# Patient Record
Sex: MALE | Race: WHITE | URBAN - METROPOLITAN AREA
[De-identification: names, ages, dates, MRNs, and addresses within clinical notes are randomized per-mention and may not be internally consistent; named-entity substitution may affect disease eponyms.]

---

## 2019-12-13 ENCOUNTER — TRANSFERRED RECORDS (OUTPATIENT)
Dept: HEALTH INFORMATION MANAGEMENT | Facility: CLINIC | Age: 29
End: 2019-12-13

## 2019-12-13 ENCOUNTER — MEDICAL CORRESPONDENCE (OUTPATIENT)
Dept: HEALTH INFORMATION MANAGEMENT | Facility: CLINIC | Age: 29
End: 2019-12-13

## 2020-01-09 ENCOUNTER — TELEPHONE (OUTPATIENT)
Dept: OPHTHALMOLOGY | Facility: CLINIC | Age: 30
End: 2020-01-09

## 2020-01-09 ENCOUNTER — OFFICE VISIT (OUTPATIENT)
Dept: OPHTHALMOLOGY | Facility: CLINIC | Age: 30
End: 2020-01-09
Attending: OPHTHALMOLOGY
Payer: MEDICAID

## 2020-01-09 DIAGNOSIS — H46.9 OPTIC NEUROPATHY, BILATERAL: ICD-10-CM

## 2020-01-09 DIAGNOSIS — H53.10 SUBJECTIVE VISUAL DISTURBANCE: Primary | ICD-10-CM

## 2020-01-09 DIAGNOSIS — H46.9 OPTIC NEUROPATHY: Primary | ICD-10-CM

## 2020-01-09 PROCEDURE — 92133 CPTRZD OPH DX IMG PST SGM ON: CPT | Mod: ZF | Performed by: OPHTHALMOLOGY

## 2020-01-09 PROCEDURE — 92083 EXTENDED VISUAL FIELD XM: CPT | Mod: ZF | Performed by: OPHTHALMOLOGY

## 2020-01-09 PROCEDURE — G0463 HOSPITAL OUTPT CLINIC VISIT: HCPCS | Mod: ZF

## 2020-01-09 ASSESSMENT — CONF VISUAL FIELD
OD_INFERIOR_TEMPORAL_RESTRICTION: 3
OD_SUPERIOR_TEMPORAL_RESTRICTION: 3
OS_NORMAL: 1

## 2020-01-09 ASSESSMENT — TONOMETRY
OD_IOP_MMHG: 15
OS_IOP_MMHG: 14
IOP_METHOD: ICARE

## 2020-01-09 ASSESSMENT — EXTERNAL EXAM - LEFT EYE: OS_EXAM: NORMAL

## 2020-01-09 ASSESSMENT — SLIT LAMP EXAM - LIDS
COMMENTS: NORMAL
COMMENTS: NORMAL

## 2020-01-09 ASSESSMENT — VISUAL ACUITY
OD_CC: 20/100
OS_CC: 20/200
METHOD: SNELLEN - LINEAR
CORRECTION_TYPE: GLASSES

## 2020-01-09 ASSESSMENT — REFRACTION_WEARINGRX
OS_SPHERE: +3.25
OD_SPHERE: +3.25

## 2020-01-09 ASSESSMENT — EXTERNAL EXAM - RIGHT EYE: OD_EXAM: NORMAL

## 2020-01-09 NOTE — LETTER
2020         RE:  :  MRN: Sawyer Beebe  1990  [id number]     Dear Dr. Hernandez,    Thank you for asking me to see your very pleasant patient, Sawyer Beebe, in neuro-ophthalmic consultation. I would like to thank you for sending your records and I have summarized them in the history of present illness. My assessment and plan are below. For further details, please see my attached clinic note.      Assessment & Plan     Sawyer Beebe is a 29 year old male with the following diagnoses:   1. Optic neuropathy       Consultation requested by Dr. Kwok for bilateral  vision loss. It began in October and has slowly been getting worse. It worsened for a couple months and then began to stabilize. Mom's brother with Chito's with [id number] mutation.  Mom's cousin has Peter's anomaly.  Only sibling of mom to have vision problems and no cousins.  Denies numbness or tingling.      Family history: LHON in maternal uncle, 15121 mutation  Medications: None    visual acuity 20/100 RIGHT eye and 20/200 left eye.  Color vision 3.5/11 RIGHT eye and 3/11 left eye.  His pupils are sluggish with no afferent pupillary defect.  Anterior segment exam normal.  Fundus exam with peripapillary telangiectasias.      It is my impression that Mr Beebe has a bilateral  Optic neuropathy with centrocecal scotomata.  He has mild optic nerve elevation both eyes with likely pseudoedema. There are peripapillary telangiectasias.  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  He does have a hemorrhage in the right eye, which is atypical for Chito's Hereditary Optic Neuropathy (LHON).  I will also obtain Chito's Hereditary Optic Neuropathy (LHON) testing.    Will order MRI to look for inflammation.  Patient not sure he wants to have this because of his insurance.  Will ask him to take idebenone 900 mg/day.  Follow up 2 months sooner as  needed for worsening symptoms.      Again, thank you for allowing me to participate in the care of your patient.      Sincerely,    Uday Kwong MD  Professor  Ophthalmology Residency   Director of Neuro-Ophthalmology  [employer] Endowed Chair  Departments of Ophthalmology, Neurology, and Neurosurgery  [facility] [address] [place]  [address]  T - [phone]  F - [phone]  TIMOTEO [email or web address]      CC: Heber Hernandez, OD  [facility]  [address]  [facility] [address]  VIA Facsimile: [phone]       DX = Chito's Hereditary Optic Neuropathy (LHON), family history, telangiectasia, centrocecal scotoma

## 2020-01-09 NOTE — NURSING NOTE
Chief Complaints and History of Present Illnesses   Patient presents with     Blurred Vision Evaluation     Chief Complaint(s) and History of Present Illness(es)     Blurred Vision Evaluation               Comments     Sawyer Beebe is a 29 year old male who presents today for    1. Vision loss, bilateral  Progressive. Onset 3 months ago. No precipitating event.   Patient has family history of LHON (maternal uncle)  Vision continuing to decline per patient account.   Patient has not had an MRI of brain done, or genetic testing done.   No eye pain.   Kayy RAYMOND 8:45 AM January 9, 2020

## 2020-01-09 NOTE — NURSING NOTE
Chief Complaints and History of Present Illnesses   Patient presents with     Blurred Vision Evaluation     Chief Complaint(s) and History of Present Illness(es)     Blurred Vision Evaluation               Comments     Sawyer Beebe is a 29 year old male who presents today for    1. Vision loss, bilateral  Progressive. Onset 3 months ago. No precipitating event.   Patient has family history of LHON (cousin)  Vision continuing to decline per patient account.   Patient has not had an MRI of brain done, or genetic testing done.   No eye pain.   Kayy RAYMOND 8:45 AM January 9, 2020

## 2020-01-09 NOTE — PROGRESS NOTES
Assessment & Plan     Sawyer Beebe is a 29 year old male with the following diagnoses:   1. Optic neuropathy       Consultation requested by Dr. Kwok for bilateral  vision loss.  It began in October and has slowly been getting worse.  It worsened for a couple months and then began to stabilize.  Mom's brother with Chito's with 38053 mutation.  Mom's cousin has Peter's anomaly.  Only sibling of mom to have vision problems and no cousins.  Denies numbness or tingling.        Family history: LHON in maternal uncle, 12245 mutation  Medications: None    visual acuity 20/100 RIGHT eye and 20/200 left eye.  Color vision 3.5/11 RIGHT eye and 3/11 left eye.  His pupils are sluggish with no afferent pupillary defect.  Anterior segment exam normal.  Fundus exam with peripapillary telangiectasias.      It is my impression that Mr Beebe has a bilateral  Optic neuropathy with centrocecal scotomata.  He has mild optic nerve elevation both eyes with likely pseudoedema. There are peripapillary telangiectasias.  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  He does have a hemorrhage in the right eye, which is atypical for Chito's Hereditary Optic Neuropathy (LHON).  I will also obtain Chito's Hereditary Optic Neuropathy (LHON) testing.    Will order MRI to look for inflammation.  Patient not sure he wants to have this because of his insurance.  Will ask him to take idebenone 900 mg/day.  Follow up 2 months sooner as needed for worsening symptoms.           Attending Physician Attestation:  Complete documentation of historical and exam elements from today's encounter can be found in the full encounter summary report (not reduplicated in this progress note).  I personally obtained the chief complaint(s) and history of present illness.  I confirmed and edited as necessary the review of systems, past medical/surgical  history, family history, social history, and examination findings as documented by others; and I examined the patient myself.  I personally reviewed the relevant tests, images, and reports as documented above.  I formulated and edited as necessary the assessment and plan and discussed the findings and management plan with the patient and family. - Uday Kwong MD

## 2020-01-09 NOTE — Clinical Note
1/9/2020       RE: Saw[facility]  [address] [facility] [address]     Dear Colleague,    Thank you for referring your patient, Sawyer Beebe, to the EYE CLINIC at [facility]. Please see a copy of my visit note below.           Assessment & Plan     Sawyer Beebe is a 29 year old male with the following diagnoses:   1. Optic neuropathy       Consultation requested by Dr. Kwok for bilateral  vision loss.  It began in October and has slowly been getting worse.  It worsened for a couple months and then began to stabilize.  Mom's brother with Chito's with 44553 mutation.  Mom's cousin has Peter's anomaly.  Only sibling of mom to have vision problems and no cousins.  Denies numbness or tingling.        Family history: LHON in maternal uncle, 72336 mutation  Medications: None    visual acuity 20/100 RIGHT eye and 20/200 left eye.  Color vision 3.5/11 RIGHT eye and 3/11 left eye.  His pupils are sluggish with no afferent pupillary defect.  Anterior segment exam normal.  Fundus exam with peripapillary telangiectasias.      It is my impression that Mr Beebe has a bilateral  Optic neuropathy with centrocecal scotomata.  He has mild optic nerve elevation both eyes with likely pseudoedema. There are peripapillary telangiectasias.  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  He does have a hemorrhage in the right eye, which is atypical for Chito's Hereditary Optic Neuropathy (LHON).  I will also obtain Chito's Hereditary Optic Neuropathy (LHON) testing.    Will order MRI to look for inflammation.  Patient not sure he wants to have this because of his insurance.  Will ask him to take idebenone 900 mg/day.  Follow up 2 months sooner as needed for worsening symptoms.           Attending Physician Attestation:  Complete documentation of historical and exam elements from  today's encounter can be found in the full encounter summary report (not reduplicated in this progress note).  I personally obtained the chief complaint(s) and history of present illness.  I confirmed and edited as necessary the review of systems, past medical/surgical history, family history, social history, and examination findings as documented by others; and I examined the patient myself.  I personally reviewed the relevant tests, images, and reports as documented above.  I formulated and edited as necessary the assessment and plan and discussed the findings and management plan with the patient and family. - Uday Kwong MD      Again, thank you for allowing me to participate in the care of your patient.      Sincerely,    Uday Kwong MD

## 2020-01-10 ENCOUNTER — TELEPHONE (OUTPATIENT)
Dept: OPHTHALMOLOGY | Facility: CLINIC | Age: 30
End: 2020-01-10

## 2020-01-10 NOTE — TELEPHONE ENCOUNTER
PAtient had LHON lab drawn yesterday and the  told patient that the forms were his to keep.  Lab needs to send those forms with sample.  Had patient fill out forms and send to us.  Then forwarded to lab and let Sandee at the lab know that just faxed.  She will have sent out on Monday.     Yasmin Pressley on 1/10/2020 at 12:53 PM

## 2020-01-14 ENCOUNTER — TELEPHONE (OUTPATIENT)
Dept: OPHTHALMOLOGY | Facility: CLINIC | Age: 30
End: 2020-01-14

## 2020-01-14 LAB — MISCELLANEOUS TEST: NORMAL

## 2020-01-14 NOTE — TELEPHONE ENCOUNTER
Mom Amara called wanting results of tests.  I can send to Sawyer at the home address as they are his chart notesNotified mom that would send there.  Sawyer can also see his results on his [employer] account.

## 2020-01-15 ENCOUNTER — TELEPHONE (OUTPATIENT)
Dept: OPHTHALMOLOGY | Facility: CLINIC | Age: 30
End: 2020-01-15

## 2020-01-15 NOTE — TELEPHONE ENCOUNTER
Spoke to Amara about whether or not her brother is comfortable with us sharing his information with the lab.  She stated that he was and that he gave his lab requisition form for us to share.  Forwarded that form onto lab.      Yasmin Pressley on 1/15/2020 at 9:44 AM

## 2020-01-28 ENCOUNTER — TELEPHONE (OUTPATIENT)
Dept: OPHTHALMOLOGY | Facility: CLINIC | Age: 30
End: 2020-01-28

## 2020-01-28 NOTE — TELEPHONE ENCOUNTER
Patient and his family have decided to pursue MRI.  Would like to have done at [facility].  Will fax order to them and have them reach out to patient to schedule.  Will be in touch after MRI with results.    Yasmin Pressley on 1/28/2020 at 11:38 AM

## 2020-03-02 ENCOUNTER — HEALTH MAINTENANCE LETTER (OUTPATIENT)
Age: 30
End: 2020-03-02

## 2020-04-28 ENCOUNTER — OFFICE VISIT (OUTPATIENT)
Dept: OPHTHALMOLOGY | Facility: CLINIC | Age: 30
End: 2020-04-28
Attending: OPHTHALMOLOGY
Payer: COMMERCIAL

## 2020-04-28 DIAGNOSIS — H53.40 VISUAL FIELD DEFECT: ICD-10-CM

## 2020-04-28 DIAGNOSIS — H47.22 LEBER HEREDITARY OPTIC NEUROPATHY: Primary | ICD-10-CM

## 2020-04-28 DIAGNOSIS — H46.9 OPTIC NEUROPATHY: ICD-10-CM

## 2020-04-28 PROCEDURE — 92133 CPTRZD OPH DX IMG PST SGM ON: CPT | Mod: ZF | Performed by: OPHTHALMOLOGY

## 2020-04-28 PROCEDURE — G0463 HOSPITAL OUTPT CLINIC VISIT: HCPCS | Mod: ZF | Performed by: TECHNICIAN/TECHNOLOGIST

## 2020-04-28 PROCEDURE — 92083 EXTENDED VISUAL FIELD XM: CPT | Mod: ZF | Performed by: OPHTHALMOLOGY

## 2020-04-28 ASSESSMENT — VISUAL ACUITY
OS_CC: 20/300
OD_CC: 20/400
METHOD: SNELLEN - LINEAR
CORRECTION_TYPE: CONTACTS

## 2020-04-28 ASSESSMENT — SLIT LAMP EXAM - LIDS
COMMENTS: NORMAL
COMMENTS: NORMAL

## 2020-04-28 ASSESSMENT — TONOMETRY
OS_IOP_MMHG: 16
OD_IOP_MMHG: 15
IOP_METHOD: ICARE

## 2020-04-28 ASSESSMENT — CONF VISUAL FIELD
OS_SUPERIOR_NASAL_RESTRICTION: 2
OD_SUPERIOR_NASAL_RESTRICTION: 2
OS_SUPERIOR_TEMPORAL_RESTRICTION: 2
OD_INFERIOR_TEMPORAL_RESTRICTION: 2
OS_INFERIOR_NASAL_RESTRICTION: 2
OD_SUPERIOR_TEMPORAL_RESTRICTION: 2
OD_INFERIOR_NASAL_RESTRICTION: 2
OS_INFERIOR_TEMPORAL_RESTRICTION: 2
METHOD: COUNTING FINGERS

## 2020-04-28 ASSESSMENT — EXTERNAL EXAM - LEFT EYE: OS_EXAM: NORMAL

## 2020-04-28 ASSESSMENT — EXTERNAL EXAM - RIGHT EYE: OD_EXAM: NORMAL

## 2020-04-28 NOTE — LETTER
2020         RE:  :  MRN: Sawyer Beebe  1990  [id number]     Dear Dr. Hernandez:     Your patient, Sawyer Beebe, returned for neuro-ophthalmic follow up. My assessment and plan are below.  For further details, please see my attached clinic note.      Assessment & Plan     Sawyer Beebe is a 29 year old male with the following diagnoses:   1. Optic neuropathy    2. Visual field defect       Patient is a 29 year old male with presumed Chito;s Hereditary Optic Neuropathy (LHON). Last visit was 2 months ago. He reports that his vision has gotten worse since last visit, but he is unchanged in what he can do for activities of daily living. He got a MRI since his last visit which was read as negative. He is taking taking 800 mg idebenone every other day alternating with 1000 mg. Genetic test for LHON. Has occasional stars and sparkles. Denies visual hallucinations.     MRI orbit 2020:  IMPRESSION:   1.  Normal orbit MRI.    Family history: LHON in maternal uncle, 70577 mutation  Medications: Idebenone 900 mg daily    Visual acuity 20/400 RIGHT eye and 20/300 left eye.  Color vision 0/11 RIGHT eye and 0/11 left eye.  His pupils are sluggish with no afferent pupillary defect.  Anterior segment exam normal.  Fundus exam with peripapillary telangiectasias and temporal pallor of both optic nerves.      Visual field worse with expansion of cecocentral scotomata to include more peripheral loss. OCT now with temporal thinning.     It is my impression that Mr Beebe has a bilateral optic neuropathy most likely due to Chito's Hereditary Optic Neuropathy (LHON) with positive family history of the 48754 mutation and characteristic fundus features.  I have personally reviewed the MRI and there is no evidence of optic nerve enhancement concerning for optic neuritis. LHON genetic test pending. Continue idebenone 900 mg/day.  Follow up 1 year.     Again, thank you for allowing me to participate in the care of your  patient.      Sincerely,    Uday Kwong MD  Professor  Ophthalmology Residency   Director of Neuro-Ophthalmology  [employer] Endowed Chair  Departments of Ophthalmology, Neurology, and Neurosurgery  [facility] [address] [place] [address]  [address]  T - [phone]  F - [phone]  TIMOTEO [email or web address]      CC: Heber Hernandez, OD  [facility]  [address] [place] [address]  VIA Facsimile: [phone]    DX = Chito's Hereditary Optic Neuropathy (LHON)

## 2020-04-28 NOTE — Clinical Note
4/28/2020       RE: [facility]  [address] [facility] [address] [address]     Dear Colleague,    Thank you for referring your patient, Sawyer Beebe, to the EYE CLINIC at [facility]. Please see a copy of my visit note below.           Assessment & Plan     Sawyer Beebe is a 29 year old male with the following diagnoses:   1. Optic neuropathy    2. Visual field defect       Patient is a 29 year old male with presumed Chito;s Hereditary Optic Neuropathy (LHON). Last visit was 2 months ago. He reports that his vision has gotten worse since last visit, but he is unchanged in what he can do for activities of daily living. He got a MRI since his last visit which was read as negative. He is taking taking 800 mg idebenone every other day alternating with 1000 mg. Genetic test for LHON. Has occasional stars and sparkles. Denies visual hallucinations.     MRI orbit 2/6/2020:  IMPRESSION:   1.  Normal orbit MRI.    Family history: LHON in maternal uncle, 45[address] mutation  Medications: Idebenone 900 mg daily    Visual acuity 20/400 RIGHT eye and 20/300 left eye.  Color vision 0/11 RIGHT eye and 0/11 left eye.  His pupils are sluggish with no afferent pupillary defect.  Anterior segment exam normal.  Fundus exam with peripapillary telangiectasias and temporal pallor of both optic nerves.      Visual field worse with expansion of cecocentral scotomata to include more peripheral loss. OCT now with temporal thinning.     It is my impression that Mr Beebe has a bilateral optic neuropathy most likely due to Chito's Hereditary Optic Neuropathy (LHON) with positive family history of the 93626 mutation and characteristic fundus features.  I have personally reviewed the MRI and there is no evidence of optic nerve enhancement concerning for optic neuritis. LHON genetic test pending. Continue idebenone 900 mg/day.  Follow up 1 year.             Attending Physician Attestation:  Complete  documentation of historical and exam elements from today's encounter can be found in the full encounter summary report (not reduplicated in this progress note).  I personally obtained the chief complaint(s) and history of present illness.  I confirmed and edited as necessary the review of systems, past medical/surgical history, family history, social history, and examination findings as documented by others; and I examined the patient myself.  I personally reviewed the relevant tests, images, and reports as documented above.  I formulated and edited as necessary the assessment and plan and discussed the findings and management plan with the patient and family. I personally reviewed the ophthalmic test(s) associated with this encounter, agree with the interpretation(s) as documented by the resident/fellow, and have edited the corresponding report(s) as necessary.  - Uday Villa MD  Ophthalmology, PGY-5  Neuro-Ophthalmology Fellow      Again, thank you for allowing me to participate in the care of your patient.      Sincerely,    Uday Kwong MD

## 2020-04-28 NOTE — PROGRESS NOTES
Assessment & Plan     Sawyer Beebe is a 29 year old male with the following diagnoses:   1. Optic neuropathy    2. Visual field defect       Patient is a 29 year old male with presumed Chito;s Hereditary Optic Neuropathy (LHON). Last visit was 2 months ago. He reports that his vision has gotten worse since last visit, but he is unchanged in what he can do for activities of daily living. He got a MRI since his last visit which was read as negative. He is taking taking 800 mg idebenone every other day alternating with 1000 mg. Genetic test for LHON. Has occasional stars and sparkles. Denies visual hallucinations.     MRI orbit 2/6/2020:  IMPRESSION:   1.  Normal orbit MRI.    Family history: LHON in maternal uncle, 51241 mutation  Medications: Idebenone 900 mg daily    Visual acuity 20/400 RIGHT eye and 20/300 left eye.  Color vision 0/11 RIGHT eye and 0/11 left eye.  His pupils are sluggish with no afferent pupillary defect.  Anterior segment exam normal.  Fundus exam with peripapillary telangiectasias and temporal pallor of both optic nerves.      Visual field worse with expansion of cecocentral scotomata to include more peripheral loss. OCT now with temporal thinning.     It is my impression that Mr Beebe has a bilateral optic neuropathy most likely due to Chito's Hereditary Optic Neuropathy (LHON) with positive family history of the 38283 mutation and characteristic fundus features.  I have personally reviewed the MRI and there is no evidence of optic nerve enhancement concerning for optic neuritis. LHON genetic test pending. Continue idebenone 900 mg/day.  Follow up 1 year.             Attending Physician Attestation:  Complete documentation of historical and exam elements from today's encounter can be found in the full encounter summary report (not reduplicated in this progress note).  I personally obtained the chief complaint(s) and history of present illness.  I confirmed and edited as necessary the  review of systems, past medical/surgical history, family history, social history, and examination findings as documented by others; and I examined the patient myself.  I personally reviewed the relevant tests, images, and reports as documented above.  I formulated and edited as necessary the assessment and plan and discussed the findings and management plan with the patient and family. I personally reviewed the ophthalmic test(s) associated with this encounter, agree with the interpretation(s) as documented by the resident/fellow, and have edited the corresponding report(s) as necessary.  - Uday Villa MD  Ophthalmology, PGY-5  Neuro-Ophthalmology Fellow

## 2020-05-20 ENCOUNTER — TELEPHONE (OUTPATIENT)
Dept: OPHTHALMOLOGY | Facility: CLINIC | Age: 30
End: 2020-05-20

## 2020-05-20 NOTE — TELEPHONE ENCOUNTER
Patient's mom called and requested notes from last visit be mailed to patient.  Mailed per their request.      Yasmin Pressley on 5/20/2020 at 9:09 AM

## 2020-12-14 ENCOUNTER — HEALTH MAINTENANCE LETTER (OUTPATIENT)
Age: 30
End: 2020-12-14

## 2021-01-28 ENCOUNTER — TRANSFERRED RECORDS (OUTPATIENT)
Dept: HEALTH INFORMATION MANAGEMENT | Facility: CLINIC | Age: 31
End: 2021-01-28

## 2021-02-01 ENCOUNTER — TELEPHONE (OUTPATIENT)
Dept: OPHTHALMOLOGY | Facility: CLINIC | Age: 31
End: 2021-02-01

## 2021-02-01 NOTE — TELEPHONE ENCOUNTER
Mom left voicemail wanting patient to get set up for a low vision refraction.  Assisted with scheduling    Yasmin Pressley on 2/1/2021 at 1:36 PM

## 2021-02-11 ENCOUNTER — OFFICE VISIT (OUTPATIENT)
Dept: OPTOMETRY | Facility: CLINIC | Age: 31
End: 2021-02-11
Payer: COMMERCIAL

## 2021-02-11 DIAGNOSIS — H46.9 OPTIC NEUROPATHY, BILATERAL: Primary | ICD-10-CM

## 2021-02-11 DIAGNOSIS — H52.03 HYPERMETROPIA OF BOTH EYES: ICD-10-CM

## 2021-02-11 ASSESSMENT — CONF VISUAL FIELD
OD_SUPERIOR_TEMPORAL_RESTRICTION: 2
OD_INFERIOR_TEMPORAL_RESTRICTION: 2
OD_SUPERIOR_NASAL_RESTRICTION: 2
OS_INFERIOR_TEMPORAL_RESTRICTION: 2
OS_INFERIOR_NASAL_RESTRICTION: 2
OS_SUPERIOR_TEMPORAL_RESTRICTION: 2
OD_INFERIOR_NASAL_RESTRICTION: 2
OS_SUPERIOR_NASAL_RESTRICTION: 2

## 2021-02-11 ASSESSMENT — TONOMETRY
OD_IOP_MMHG: 15
IOP_METHOD: ICARE
OS_IOP_MMHG: 15

## 2021-02-11 ASSESSMENT — REFRACTION_MANIFEST
OD_CYLINDER: SPHERE
OD_SPHERE: +3.25
OS_SPHERE: +3.00
OS_CYLINDER: SPHERE
OD_ADD: +3.50
OS_ADD: +3.50

## 2021-02-11 ASSESSMENT — VISUAL ACUITY
METHOD: SNELLEN - LINEAR
OD_SC: 20/400

## 2021-02-11 ASSESSMENT — EXTERNAL EXAM - LEFT EYE: OS_EXAM: NORMAL

## 2021-02-11 ASSESSMENT — EXTERNAL EXAM - RIGHT EYE: OD_EXAM: NORMAL

## 2021-02-11 ASSESSMENT — REFRACTION_CURRENTRX
OS_CYLINDER: SPHERE
OD_CYLINDER: SPHERE
OS_SPHERE: +4.25
OD_SPHERE: +4.25

## 2021-02-11 ASSESSMENT — SLIT LAMP EXAM - LIDS
COMMENTS: NORMAL
COMMENTS: NORMAL

## 2021-02-11 NOTE — PROGRESS NOTES
Assessment/Plan  (H46.9) Optic neuropathy, bilateral  (primary encounter diagnosis)  Comment: Presumed Chito's hereditary optic neuropathy due to family history.and condition onset  Plan: Low Vision (OT) Referral        Discussed findings with patient. Some improvement in vision today with glasses. Patient demonstrated understanding that refractive error constitutes only a small reason for his reduced vision. Continued use of  Megan and other handheld magnifiers (iPhone magnifier is regularly used) should be helpful for near work. Recommend patient return to clinic for eSight demonstration. Patient is due to return to see Dr. Kwong in about 2 months and would be welcome to follow up with this provider at that time as well. Recommend low vision OT evaluation with focus on discussing continued independence with ADL's moving forward.     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 SRx updated and released to patient.       More than 60 minutes were spent on the date of the encounter doing chart review, history and exam, documentation, and further activities as noted above.    Complete documentation of historical and exam elements from today's encounter can  be found in the full encounter summary report (not reduplicated in this progress  note). I personally obtained the chief complaint(s) and history of present illness. I  confirmed and edited as necessary the review of systems, past medical/surgical  history, family history, social history, and examination findings as documented by  others; and I examined the patient myself. I personally reviewed the relevant tests,  images, and reports as documented above. I formulated and edited as necessary the  assessment and plan and discussed the findings and management plan with the  patient and family.    Pola Canales OD

## 2021-02-22 LAB
LOCATION PERFORMED: NORMAL
RESULT: NORMAL
SEND OUTS MISC TEST CODE: NORMAL
SEND OUTS MISC TEST SPECIMEN: NORMAL
TEST NAME: NORMAL

## 2021-03-01 DIAGNOSIS — Z53.9 ERRONEOUS ENCOUNTER--DISREGARD: ICD-10-CM

## 2021-03-01 DIAGNOSIS — H46.9 OPTIC NEUROPATHY: Primary | ICD-10-CM

## 2021-03-01 PROCEDURE — 99207 PR NO BILLABLE SERVICE THIS VISIT: CPT | Performed by: OPHTHALMOLOGY

## 2021-03-04 ENCOUNTER — TELEPHONE (OUTPATIENT)
Dept: OPHTHALMOLOGY | Facility: CLINIC | Age: 31
End: 2021-03-04

## 2021-03-04 NOTE — TELEPHONE ENCOUNTER
Still no Chito's results from [facility].  I sent message to them twice and was hoping they would get back.  Mother was wondering if we could draw labs and send them to a different lab.  Her family members had got their test from [facility] and it was $700 out of pocket.     I left voicemail saying it is possible we could go about that.  I asked if it would be okay to just wait on it until I get back in clinic on 3/17 and see if [facility] lab gets back to us, and if they do not can look into how to pursue getting out of [facility].     Yasmin Pressley on 3/4/2021 at 4:47 PM

## 2021-03-30 ENCOUNTER — TELEPHONE (OUTPATIENT)
Dept: OPHTHALMOLOGY | Facility: CLINIC | Age: 31
End: 2021-03-30

## 2021-03-30 DIAGNOSIS — H46.9 OPTIC NEUROPATHY, BILATERAL: Primary | ICD-10-CM

## 2021-03-30 NOTE — TELEPHONE ENCOUNTER
Received approval to get LHON test through [facility].  Notify family so can sign form and bring with them to lab appointment.     Yasmin Pressley on 3/30/2021 at 3:51 PM

## 2021-03-31 DIAGNOSIS — H46.9 OPTIC NEUROPATHY, BILATERAL: ICD-10-CM

## 2021-03-31 LAB — MISCELLANEOUS TEST: NORMAL

## 2021-04-15 ENCOUNTER — TELEPHONE (OUTPATIENT)
Dept: OPHTHALMOLOGY | Facility: CLINIC | Age: 31
End: 2021-04-15

## 2021-04-15 LAB — LAB SCANNED RESULT: ABNORMAL

## 2021-04-15 NOTE — CONFIDENTIAL NOTE
Attempted to call patient's listed phone numbers.  Left message that we got results back and that will mail positive ON results.     Yasmin Pressley on 4/15/2021 at 10:33 AM

## 2021-04-18 ENCOUNTER — HEALTH MAINTENANCE LETTER (OUTPATIENT)
Age: 31
End: 2021-04-18

## 2021-10-02 ENCOUNTER — HEALTH MAINTENANCE LETTER (OUTPATIENT)
Age: 31
End: 2021-10-02

## 2022-05-09 ENCOUNTER — TELEPHONE (OUTPATIENT)
Dept: OPHTHALMOLOGY | Facility: CLINIC | Age: 32
End: 2022-05-09
Payer: COMMERCIAL

## 2022-05-09 NOTE — TELEPHONE ENCOUNTER
Left voicemail for mom Leticia returning her call wondering if patient should be seen here for follow up or at [facility] closer to home.  Stated can be seen here or there, and they could always send to here if [facility] something concerning.     Yasmin Pressley on 5/9/2022 at 10:25 AM

## 2022-05-14 ENCOUNTER — HEALTH MAINTENANCE LETTER (OUTPATIENT)
Age: 32
End: 2022-05-14

## 2022-06-16 ENCOUNTER — OFFICE VISIT (OUTPATIENT)
Dept: OPHTHALMOLOGY | Facility: CLINIC | Age: 32
End: 2022-06-16
Attending: OPHTHALMOLOGY
Payer: MEDICARE

## 2022-06-16 DIAGNOSIS — H53.40 VISUAL FIELD DEFECT: Primary | ICD-10-CM

## 2022-06-16 DIAGNOSIS — H46.9 OPTIC NEUROPATHY, BILATERAL: Primary | ICD-10-CM

## 2022-06-16 DIAGNOSIS — H53.40 VISUAL FIELD DEFECT: ICD-10-CM

## 2022-06-16 PROCEDURE — 92083 EXTENDED VISUAL FIELD XM: CPT | Mod: 26 | Performed by: INTERNAL MEDICINE

## 2022-06-16 PROCEDURE — 92133 CPTRZD OPH DX IMG PST SGM ON: CPT | Mod: 26 | Performed by: INTERNAL MEDICINE

## 2022-06-16 PROCEDURE — 92133 CPTRZD OPH DX IMG PST SGM ON: CPT | Performed by: OPHTHALMOLOGY

## 2022-06-16 PROCEDURE — G0463 HOSPITAL OUTPT CLINIC VISIT: HCPCS | Mod: 25

## 2022-06-16 PROCEDURE — 92083 EXTENDED VISUAL FIELD XM: CPT | Performed by: OPHTHALMOLOGY

## 2022-06-16 PROCEDURE — G0463 HOSPITAL OUTPT CLINIC VISIT: HCPCS

## 2022-06-16 PROCEDURE — 92012 INTRM OPH EXAM EST PATIENT: CPT | Mod: GC | Performed by: INTERNAL MEDICINE

## 2022-06-16 ASSESSMENT — EXTERNAL EXAM - LEFT EYE: OS_EXAM: NORMAL

## 2022-06-16 ASSESSMENT — CONF VISUAL FIELD
OD_NORMAL: 1
METHOD: COUNTING FINGERS
OS_NORMAL: 1

## 2022-06-16 ASSESSMENT — TONOMETRY
OS_IOP_MMHG: 19
IOP_METHOD: ICARE
OD_IOP_MMHG: 18

## 2022-06-16 ASSESSMENT — SLIT LAMP EXAM - LIDS
COMMENTS: NORMAL
COMMENTS: NORMAL

## 2022-06-16 ASSESSMENT — VISUAL ACUITY
OS_SC: 20/600
METHOD: SNELLEN - LINEAR
OD_SC: 20/400
OS_PH_SC: 20/300

## 2022-06-16 ASSESSMENT — EXTERNAL EXAM - RIGHT EYE: OD_EXAM: NORMAL

## 2022-06-16 NOTE — PROGRESS NOTES
Assessment & Plan     Sawyer Beebe is a 31 year old male with the following diagnoses:   1. Optic neuropathy, bilateral    2. Visual field defect           Last visit 4/28/20 with Dr. Villa.        Patient was last seen on 4/28/2020 for evaluation of bilateral optic neuropathy.  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  Patient had been taking idebenone 900 mg/day.    Exam:  Visual acuity without correction was 20/400 in the right eye and 20/300 in the left eye. IOP was 19 in the right eye and 19 in the left eye. Visual fields were full in both eyes. Ocular motility was full in all gaze directions. Color plates were   Pupils were equal, round and reactive to light with no RAPD.     Strabismus exam: full ortho  Anterior segment exam: normal  Dilated fundus exam: 2DD hyperpigemented nevus along inferior arcade.    Tests ordered and interpreted today:  OCT rNFL demonstrated a mean NFL thickness of 49 in the right eye and 42 in the left eye. Thickness profile demonstrated diffuse thinning in both eyes.  VF: Octopus 30 degree automated static perimetry visual field was performed. Test was reliable.. Right eye central scotoma with nasal step.. Left eye  Circumferential constriction with central scotoma    History of LHON. OCT RNFL and macula with progression in thinning but affterent function is objectively stable to improved on VA (better LEFT eye) and VF testing (better RIGHT eye).               Attending Physician Attestation:  Complete documentation of historical and exam elements from today's encounter can be found in the full encounter summary report (not reduplicated in this progress note).  I personally obtained the chief complaint(s) and history of present illness.  I confirmed and edited as necessary the review of systems, past medical/surgical history, family history, social history, and examination findings as documented by others; and I examined the patient myself.  I personally reviewed  the relevant tests, images, and reports as documented above.  I formulated and edited as necessary the assessment and plan and discussed the findings and management plan with the patient and family. I personally reviewed the ophthalmic test(s) associated with this encounter, agree with the interpretation(s) as documented by the resident/fellow, and have edited the corresponding report(s) as necessary.  - Uday Lux, DO   PGY-5 Neuro-Ophthalmology Fellow

## 2022-06-16 NOTE — NURSING NOTE
Chief Complaints and History of Present Illnesses   Patient presents with     Follow Up     Chito hereditary optic neuropathy      Chief Complaint(s) and History of Present Illness(es)     Follow Up     Laterality: both eyes    Onset: unknown    Onset: years ago    Course: gradually worsening    Associated symptoms: floaters.  Negative for double vision, dryness, eye pain, tearing, abnormal color vision, photophobia and flashes    Pain scale: 0/10    Comments: Chito hereditary optic neuropathy               Comments     Pt states vision is slightly worse since last visit.  No change to floaters LE.  No ocular medications.    KARUNA Lim June 16, 2022 12:56 PM

## 2022-07-09 ENCOUNTER — HEALTH MAINTENANCE LETTER (OUTPATIENT)
Age: 32
End: 2022-07-09

## 2022-09-03 ENCOUNTER — HEALTH MAINTENANCE LETTER (OUTPATIENT)
Age: 32
End: 2022-09-03

## 2023-07-22 ENCOUNTER — HEALTH MAINTENANCE LETTER (OUTPATIENT)
Age: 33
End: 2023-07-22

## 2024-09-14 ENCOUNTER — HEALTH MAINTENANCE LETTER (OUTPATIENT)
Age: 34
End: 2024-09-14